# Patient Record
Sex: FEMALE | Race: WHITE | NOT HISPANIC OR LATINO | URBAN - METROPOLITAN AREA
[De-identification: names, ages, dates, MRNs, and addresses within clinical notes are randomized per-mention and may not be internally consistent; named-entity substitution may affect disease eponyms.]

---

## 2023-11-30 ENCOUNTER — EMERGENCY (EMERGENCY)
Facility: HOSPITAL | Age: 63
LOS: 1 days | Discharge: HOME | End: 2023-11-30
Admitting: EMERGENCY MEDICINE
Payer: MEDICARE

## 2023-11-30 VITALS
RESPIRATION RATE: 16 BRPM | SYSTOLIC BLOOD PRESSURE: 146 MMHG | HEART RATE: 97 BPM | TEMPERATURE: 98 F | DIASTOLIC BLOOD PRESSURE: 64 MMHG | OXYGEN SATURATION: 97 %

## 2023-11-30 DIAGNOSIS — M79.671 PAIN IN RIGHT FOOT: ICD-10-CM
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
## 2023-11-30 DIAGNOSIS — S90.811A ABRASION, RIGHT FOOT, INITIAL ENCOUNTER: ICD-10-CM

## 2023-11-30 PROCEDURE — 99283 EMERGENCY DEPT VISIT LOW MDM: CPT

## 2023-11-30 PROCEDURE — 73630 X-RAY EXAM OF FOOT: CPT | Mod: 26,RT

## 2023-11-30 NOTE — ED PROVIDER NOTE - OBJECTIVE STATEMENT
64 y/o female here with multiple complaints now here stating she thinks there may be glass in her right foot after stepping on some weeks ago. No acute complaints. Patient is a poor historian. Patient appears well NAD stable. Triage note appreciated -primary complaint is determining whether glass remains in foot

## 2023-11-30 NOTE — ED ADULT NURSE NOTE - OBJECTIVE STATEMENT
Pt presents to ed co cut on foot to right foot x 2 weeks. Patient able to ambulate with walker. Pt denies pain in foot. Pt also reports that she had a concussion oct 17. Pt would like reeval, reports recent admission, upon dc said they wanted to follow up for brain bleed.

## 2023-11-30 NOTE — ED ADULT TRIAGE NOTE - CHIEF COMPLAINT QUOTE
Pt states she stepped on a glass ornament x 2 wks ago. Pt states she thinks there's still glass in foot, ambulating with steady gait. Pt states trip/fall in october with +HS, ct scan was done but the results are unclear, pt poor historian. Pt states she was kept overnight and given a blood thinner and seizure meds. Hx MS

## 2023-11-30 NOTE — ED PROVIDER NOTE - PATIENT PORTAL LINK FT
You can access the FollowMyHealth Patient Portal offered by [facility] by registering at the following website: [email or web address]. By joining [employer]’s FollowMyHealth portal, you will also be able to view your health information using other applications (apps) compatible with our system.

## 2023-11-30 NOTE — ED PROVIDER NOTE - CLINICAL SUMMARY MEDICAL DECISION MAKING FREE TEXT BOX
patient here with request to r/o FB in right foot.   XR wet read neg  Poor historian    d/c with return precautions

## 2023-12-03 ENCOUNTER — EMERGENCY (EMERGENCY)
Facility: HOSPITAL | Age: 63
LOS: 1 days | Discharge: HOME | End: 2023-12-03
Attending: EMERGENCY MEDICINE | Admitting: EMERGENCY MEDICINE
Payer: MEDICARE

## 2023-12-03 VITALS
TEMPERATURE: 100 F | DIASTOLIC BLOOD PRESSURE: 92 MMHG | RESPIRATION RATE: 16 BRPM | HEIGHT: 64 IN | WEIGHT: 121.92 LBS | OXYGEN SATURATION: 98 % | HEART RATE: 94 BPM | SYSTOLIC BLOOD PRESSURE: 152 MMHG

## 2023-12-03 VITALS — HEART RATE: 77 BPM
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
## 2023-12-03 DIAGNOSIS — Z87.39 PERSONAL HISTORY OF OTHER DISEASES OF THE MUSCULOSKELETAL SYSTEM AND CONNECTIVE TISSUE: ICD-10-CM

## 2023-12-03 DIAGNOSIS — R51.9 HEADACHE, UNSPECIFIED: ICD-10-CM

## 2023-12-03 DIAGNOSIS — S06.5XAA TRAUMATIC SUBDURAL HEMORRHAGE WITH LOSS OF CONSCIOUSNESS STATUS UNKNOWN, INITIAL ENCOUNTER: ICD-10-CM
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
## 2023-12-03 LAB
ALBUMIN SERPL ELPH-MCNC: 4.7 G/DL — SIGNIFICANT CHANGE UP (ref 3.4–5)
ALBUMIN SERPL ELPH-MCNC: 4.7 G/DL — SIGNIFICANT CHANGE UP (ref 3.4–5)
ALP SERPL-CCNC: 66 U/L — SIGNIFICANT CHANGE UP (ref 40–120)
ALP SERPL-CCNC: 66 U/L — SIGNIFICANT CHANGE UP (ref 40–120)
ALT FLD-CCNC: 13 U/L — SIGNIFICANT CHANGE UP (ref 12–42)
ALT FLD-CCNC: 13 U/L — SIGNIFICANT CHANGE UP (ref 12–42)
ANION GAP SERPL CALC-SCNC: 13 MMOL/L — SIGNIFICANT CHANGE UP (ref 9–16)
ANION GAP SERPL CALC-SCNC: 13 MMOL/L — SIGNIFICANT CHANGE UP (ref 9–16)
APTT BLD: 36.9 SEC — HIGH (ref 24.5–35.6)
APTT BLD: 36.9 SEC — HIGH (ref 24.5–35.6)
AST SERPL-CCNC: 12 U/L — LOW (ref 15–37)
AST SERPL-CCNC: 12 U/L — LOW (ref 15–37)
BASOPHILS # BLD AUTO: 0.05 K/UL — SIGNIFICANT CHANGE UP (ref 0–0.2)
BASOPHILS # BLD AUTO: 0.05 K/UL — SIGNIFICANT CHANGE UP (ref 0–0.2)
BASOPHILS NFR BLD AUTO: 0.6 % — SIGNIFICANT CHANGE UP (ref 0–2)
BASOPHILS NFR BLD AUTO: 0.6 % — SIGNIFICANT CHANGE UP (ref 0–2)
BILIRUB SERPL-MCNC: 0.4 MG/DL — SIGNIFICANT CHANGE UP (ref 0.2–1.2)
BILIRUB SERPL-MCNC: 0.4 MG/DL — SIGNIFICANT CHANGE UP (ref 0.2–1.2)
BUN SERPL-MCNC: 19 MG/DL — SIGNIFICANT CHANGE UP (ref 7–23)
BUN SERPL-MCNC: 19 MG/DL — SIGNIFICANT CHANGE UP (ref 7–23)
CALCIUM SERPL-MCNC: 10.4 MG/DL — SIGNIFICANT CHANGE UP (ref 8.5–10.5)
CALCIUM SERPL-MCNC: 10.4 MG/DL — SIGNIFICANT CHANGE UP (ref 8.5–10.5)
CHLORIDE SERPL-SCNC: 100 MMOL/L — SIGNIFICANT CHANGE UP (ref 96–108)
CHLORIDE SERPL-SCNC: 100 MMOL/L — SIGNIFICANT CHANGE UP (ref 96–108)
CO2 SERPL-SCNC: 26 MMOL/L — SIGNIFICANT CHANGE UP (ref 22–31)
CO2 SERPL-SCNC: 26 MMOL/L — SIGNIFICANT CHANGE UP (ref 22–31)
CREAT SERPL-MCNC: 0.83 MG/DL — SIGNIFICANT CHANGE UP (ref 0.5–1.3)
CREAT SERPL-MCNC: 0.83 MG/DL — SIGNIFICANT CHANGE UP (ref 0.5–1.3)
EGFR: 79 ML/MIN/1.73M2 — SIGNIFICANT CHANGE UP
EGFR: 79 ML/MIN/1.73M2 — SIGNIFICANT CHANGE UP
EOSINOPHIL # BLD AUTO: 0.12 K/UL — SIGNIFICANT CHANGE UP (ref 0–0.5)
EOSINOPHIL # BLD AUTO: 0.12 K/UL — SIGNIFICANT CHANGE UP (ref 0–0.5)
EOSINOPHIL NFR BLD AUTO: 1.4 % — SIGNIFICANT CHANGE UP (ref 0–6)
EOSINOPHIL NFR BLD AUTO: 1.4 % — SIGNIFICANT CHANGE UP (ref 0–6)
GLUCOSE BLDC GLUCOMTR-MCNC: 117 MG/DL — HIGH (ref 70–99)
GLUCOSE BLDC GLUCOMTR-MCNC: 117 MG/DL — HIGH (ref 70–99)
GLUCOSE SERPL-MCNC: 114 MG/DL — HIGH (ref 70–99)
GLUCOSE SERPL-MCNC: 114 MG/DL — HIGH (ref 70–99)
HCT VFR BLD CALC: 39.3 % — SIGNIFICANT CHANGE UP (ref 34.5–45)
HCT VFR BLD CALC: 39.3 % — SIGNIFICANT CHANGE UP (ref 34.5–45)
HGB BLD-MCNC: 13.1 G/DL — SIGNIFICANT CHANGE UP (ref 11.5–15.5)
HGB BLD-MCNC: 13.1 G/DL — SIGNIFICANT CHANGE UP (ref 11.5–15.5)
IMM GRANULOCYTES NFR BLD AUTO: 0.2 % — SIGNIFICANT CHANGE UP (ref 0–0.9)
IMM GRANULOCYTES NFR BLD AUTO: 0.2 % — SIGNIFICANT CHANGE UP (ref 0–0.9)
INR BLD: 1.04 — SIGNIFICANT CHANGE UP (ref 0.85–1.18)
INR BLD: 1.04 — SIGNIFICANT CHANGE UP (ref 0.85–1.18)
LACTATE BLDV-MCNC: 1.5 MMOL/L — SIGNIFICANT CHANGE UP (ref 0.5–2)
LACTATE BLDV-MCNC: 1.5 MMOL/L — SIGNIFICANT CHANGE UP (ref 0.5–2)
LYMPHOCYTES # BLD AUTO: 3.86 K/UL — HIGH (ref 1–3.3)
LYMPHOCYTES # BLD AUTO: 3.86 K/UL — HIGH (ref 1–3.3)
LYMPHOCYTES # BLD AUTO: 44.5 % — HIGH (ref 13–44)
LYMPHOCYTES # BLD AUTO: 44.5 % — HIGH (ref 13–44)
MAGNESIUM SERPL-MCNC: 2.4 MG/DL — SIGNIFICANT CHANGE UP (ref 1.6–2.6)
MAGNESIUM SERPL-MCNC: 2.4 MG/DL — SIGNIFICANT CHANGE UP (ref 1.6–2.6)
MCHC RBC-ENTMCNC: 30.3 PG — SIGNIFICANT CHANGE UP (ref 27–34)
MCHC RBC-ENTMCNC: 30.3 PG — SIGNIFICANT CHANGE UP (ref 27–34)
MCHC RBC-ENTMCNC: 33.3 GM/DL — SIGNIFICANT CHANGE UP (ref 32–36)
MCHC RBC-ENTMCNC: 33.3 GM/DL — SIGNIFICANT CHANGE UP (ref 32–36)
MCV RBC AUTO: 90.8 FL — SIGNIFICANT CHANGE UP (ref 80–100)
MCV RBC AUTO: 90.8 FL — SIGNIFICANT CHANGE UP (ref 80–100)
MONOCYTES # BLD AUTO: 0.87 K/UL — SIGNIFICANT CHANGE UP (ref 0–0.9)
MONOCYTES # BLD AUTO: 0.87 K/UL — SIGNIFICANT CHANGE UP (ref 0–0.9)
MONOCYTES NFR BLD AUTO: 10 % — SIGNIFICANT CHANGE UP (ref 2–14)
MONOCYTES NFR BLD AUTO: 10 % — SIGNIFICANT CHANGE UP (ref 2–14)
NEUTROPHILS # BLD AUTO: 3.75 K/UL — SIGNIFICANT CHANGE UP (ref 1.8–7.4)
NEUTROPHILS # BLD AUTO: 3.75 K/UL — SIGNIFICANT CHANGE UP (ref 1.8–7.4)
NEUTROPHILS NFR BLD AUTO: 43.3 % — SIGNIFICANT CHANGE UP (ref 43–77)
NEUTROPHILS NFR BLD AUTO: 43.3 % — SIGNIFICANT CHANGE UP (ref 43–77)
NRBC # BLD: 0 /100 WBCS — SIGNIFICANT CHANGE UP (ref 0–0)
NRBC # BLD: 0 /100 WBCS — SIGNIFICANT CHANGE UP (ref 0–0)
NT-PROBNP SERPL-SCNC: 27 PG/ML — SIGNIFICANT CHANGE UP
NT-PROBNP SERPL-SCNC: 27 PG/ML — SIGNIFICANT CHANGE UP
PCO2 BLDV: 50 MMHG — HIGH (ref 39–42)
PCO2 BLDV: 50 MMHG — HIGH (ref 39–42)
PH BLDV: 7.38 — SIGNIFICANT CHANGE UP (ref 7.32–7.43)
PH BLDV: 7.38 — SIGNIFICANT CHANGE UP (ref 7.32–7.43)
PLATELET # BLD AUTO: 343 K/UL — SIGNIFICANT CHANGE UP (ref 150–400)
PLATELET # BLD AUTO: 343 K/UL — SIGNIFICANT CHANGE UP (ref 150–400)
PO2 BLDV: <35 MMHG — LOW (ref 25–45)
PO2 BLDV: <35 MMHG — LOW (ref 25–45)
POTASSIUM SERPL-MCNC: 4.6 MMOL/L — SIGNIFICANT CHANGE UP (ref 3.5–5.3)
POTASSIUM SERPL-MCNC: 4.6 MMOL/L — SIGNIFICANT CHANGE UP (ref 3.5–5.3)
POTASSIUM SERPL-SCNC: 4.6 MMOL/L — SIGNIFICANT CHANGE UP (ref 3.5–5.3)
POTASSIUM SERPL-SCNC: 4.6 MMOL/L — SIGNIFICANT CHANGE UP (ref 3.5–5.3)
PROT SERPL-MCNC: 8.3 G/DL — HIGH (ref 6.4–8.2)
PROT SERPL-MCNC: 8.3 G/DL — HIGH (ref 6.4–8.2)
PROTHROM AB SERPL-ACNC: 11.7 SEC — SIGNIFICANT CHANGE UP (ref 9.5–13)
PROTHROM AB SERPL-ACNC: 11.7 SEC — SIGNIFICANT CHANGE UP (ref 9.5–13)
RBC # BLD: 4.33 M/UL — SIGNIFICANT CHANGE UP (ref 3.8–5.2)
RBC # BLD: 4.33 M/UL — SIGNIFICANT CHANGE UP (ref 3.8–5.2)
RBC # FLD: 12.5 % — SIGNIFICANT CHANGE UP (ref 10.3–14.5)
RBC # FLD: 12.5 % — SIGNIFICANT CHANGE UP (ref 10.3–14.5)
SAO2 % BLDV: 19.3 % — LOW (ref 67–88)
SAO2 % BLDV: 19.3 % — LOW (ref 67–88)
SODIUM SERPL-SCNC: 139 MMOL/L — SIGNIFICANT CHANGE UP (ref 132–145)
SODIUM SERPL-SCNC: 139 MMOL/L — SIGNIFICANT CHANGE UP (ref 132–145)
TROPONIN I, HIGH SENSITIVITY RESULT: 5.7 NG/L — SIGNIFICANT CHANGE UP
TROPONIN I, HIGH SENSITIVITY RESULT: 5.7 NG/L — SIGNIFICANT CHANGE UP
WBC # BLD: 8.67 K/UL — SIGNIFICANT CHANGE UP (ref 3.8–10.5)
WBC # BLD: 8.67 K/UL — SIGNIFICANT CHANGE UP (ref 3.8–10.5)
WBC # FLD AUTO: 8.67 K/UL — SIGNIFICANT CHANGE UP (ref 3.8–10.5)
WBC # FLD AUTO: 8.67 K/UL — SIGNIFICANT CHANGE UP (ref 3.8–10.5)

## 2023-12-03 PROCEDURE — 70450 CT HEAD/BRAIN W/O DYE: CPT | Mod: 26,MH

## 2023-12-03 PROCEDURE — 99284 EMERGENCY DEPT VISIT MOD MDM: CPT | Mod: GC

## 2023-12-03 PROCEDURE — 71046 X-RAY EXAM CHEST 2 VIEWS: CPT | Mod: 26

## 2023-12-03 RX ORDER — SODIUM CHLORIDE 9 MG/ML
1000 INJECTION INTRAMUSCULAR; INTRAVENOUS; SUBCUTANEOUS ONCE
Refills: 0 | Status: COMPLETED | OUTPATIENT
Start: 2023-12-03 | End: 2023-12-03

## 2023-12-03 RX ORDER — DEXAMETHASONE 0.5 MG/5ML
1 ELIXIR ORAL
Qty: 4 | Refills: 0
Start: 2023-12-03 | End: 2023-12-06

## 2023-12-03 RX ORDER — DEXAMETHASONE 0.5 MG/5ML
1 ELIXIR ORAL
Qty: 3 | Refills: 0
Start: 2023-12-03 | End: 2023-12-05

## 2023-12-03 RX ORDER — DEXAMETHASONE 0.5 MG/5ML
1 ELIXIR ORAL
Qty: 16 | Refills: 0
Start: 2023-12-03 | End: 2023-12-06

## 2023-12-03 RX ORDER — DEXAMETHASONE 0.5 MG/5ML
4 ELIXIR ORAL ONCE
Refills: 0 | Status: COMPLETED | OUTPATIENT
Start: 2023-12-03 | End: 2023-12-03

## 2023-12-03 RX ORDER — DEXAMETHASONE 0.5 MG/5ML
1 ELIXIR ORAL
Qty: 5 | Refills: 0
Start: 2023-12-03 | End: 2023-12-07

## 2023-12-03 RX ORDER — MECLIZINE HCL 12.5 MG
25 TABLET ORAL ONCE
Refills: 0 | Status: COMPLETED | OUTPATIENT
Start: 2023-12-03 | End: 2023-12-03

## 2023-12-03 RX ORDER — DEXAMETHASONE 0.5 MG/5ML
1 ELIXIR ORAL
Qty: 12 | Refills: 0
Start: 2023-12-03 | End: 2023-12-05

## 2023-12-03 RX ORDER — DEXAMETHASONE 0.5 MG/5ML
1 ELIXIR ORAL
Qty: 2 | Refills: 0
Start: 2023-12-03 | End: 2023-12-04

## 2023-12-03 RX ADMIN — Medication 4 MILLIGRAM(S): at 21:11

## 2023-12-03 RX ADMIN — SODIUM CHLORIDE 1000 MILLILITER(S): 9 INJECTION INTRAMUSCULAR; INTRAVENOUS; SUBCUTANEOUS at 20:14

## 2023-12-03 NOTE — ED PROVIDER NOTE - CLINICAL SUMMARY MEDICAL DECISION MAKING FREE TEXT BOX
63-year-old female pmhx of MS, recent fall in October diagnosed with a hematoma (of uncertain location of the head) as well as a concussion comes to ED with reports of feeling off balance, and decreased p.o. intake. Patient was seen in [facility] and [place] for the fall/hematoma/concussion.  Patient was supposed to have a follow-up visit to repeat CT imaging 2 to 3 weeks later but did not make this visit.  Patient reports that they normally have feelings of decreased p.o. intake whenever they have MS flare. Patient reports that symptoms have been worsening over the past couple weeks worsened over the last couple days prompting visit to the emergency department. Their pain/symptom is moderate, constant, non mediating with rest. Denies fever, headache, LOC, Head Trauma, AMS, syncope, shortness of breath, cough, rhinorrhea, congestion, chest pain, palpitations, abdominal pain, nausea, vomiting, diarrhea, constipation, melena, hematochezia, dysuria, hematuria, urinary frequency, flank pain, skin changes, issues urinating, issues stooling, issues with ambulation, back pain.    Meds/blood thinner use: After discharge for hematoma patient was on Levetirecetam for 1 week as prescribed for seizure prevention. Denies blood thinner use currently.  Denies seizure med use currently.    Social hx smoking, drinking, drug use.  Allergies [facility]    Pharmacy [facility] pharmacy [id number]    PCP Dr. Hart, [id number]    General: non-toxic, NAD   HEENT: Normal tympanic membrane, no erythema, no effusion.  NCAT, PERRL   Cardiac: RRR, no murmurs, 2+ peripheral pulses   Chest: CTAB   Abdomen: soft, non-distended, bowel sounds present, no ttp, no rebound or guarding   Extremities: no peripheral edema, calf tenderness, or leg size discrepancies   Skin: no rashes   Neuro: AAOx4, 5+motor, sensory grossly intact, normal cerebellar exam.  Psych: mood and affect appropriate    Impression: 63-year-old female pmhx of MS, recent fall in October diagnosed with a hematoma (of uncertain location of the head) as well as a concussion comes to ED with reports of feeling off balance, and decreased p.o. intake. Their symptoms and exam findings are concerning for viral illness, metabolic abnormality.  Due to recent hematoma in the setting of missed CT follow-up plan to CT image to evaluate.     Ordered labs, imaging, medications for diagnosis, management, and treatment. 63-year-old female pmhx of MS, recent fall in October diagnosed with a hematoma (of uncertain location of the head) as well as a concussion comes to ED with reports of feeling off balance, and decreased p.o. intake. Patient was seen in [facility] [place] for the fall/hematoma/concussion.  Patient was supposed to have a follow-up visit to repeat CT imaging 2 to 3 weeks later but did not make this visit.  Patient reports that they normally have feelings of decreased p.o. intake whenever they have MS flare. Patient reports that symptoms have been worsening over the past couple weeks worsened over the last couple days prompting visit to the emergency department. Their pain/symptom is moderate, constant, non mediating with rest. Denies fever, headache, LOC, Head Trauma, AMS, syncope, shortness of breath, cough, rhinorrhea, congestion, chest pain, palpitations, abdominal pain, nausea, vomiting, diarrhea, constipation, melena, hematochezia, dysuria, hematuria, urinary frequency, flank pain, skin changes, issues urinating, issues stooling, issues with ambulation, back pain.    Meds/blood thinner use: After discharge for hematoma patient was on Levetirecetam for 1 week as prescribed for seizure prevention. Denies blood thinner use currently.  Denies seizure med use currently.    Social hx smoking, drinking, drug use.  Allergies [facility]    Pharmacy [facility] pharmacy [id number]    PCP Dr. Hart, [id number]    General: non-toxic, NAD   HEENT: Normal tympanic membrane, no erythema, no effusion.  NCAT, PERRL   Cardiac: RRR, no murmurs, 2+ peripheral pulses   Chest: CTAB   Abdomen: soft, non-distended, bowel sounds present, no ttp, no rebound or guarding   Extremities: no peripheral edema, calf tenderness, or leg size discrepancies   Skin: no rashes   Neuro: AAOx4, 5+motor, sensory grossly intact, normal cerebellar exam.  Psych: mood and affect appropriate    Impression: 63-year-old female pmhx of MS, recent fall in October diagnosed with a hematoma (of uncertain location of the head) as well as a concussion comes to ED with reports of feeling off balance, and decreased p.o. intake. Their symptoms and exam findings are concerning for viral illness, metabolic abnormality.  Due to recent hematoma in the setting of missed CT follow-up plan to CT image to evaluate.     Ordered labs, imaging, medications for diagnosis, management, and treatment.

## 2023-12-03 NOTE — ED PROVIDER NOTE - PATIENT PORTAL LINK FT
You can access the FollowMyHealth Patient Portal offered by [facility] by registering at the following website: [email or web address]. By joining [employer]’s FollowMyHealth portal, you will also be able to view your health information using other applications (apps) compatible with our system. You can access the FollowMyHealth Patient Portal offered by [facility] by registering at the following website: [email or web address]. By joining [facility]’s FollowMyHealth portal, you will also be able to view your health information using other applications (apps) compatible with our system.

## 2023-12-03 NOTE — ED ADULT NURSE NOTE - NSFALLUNIVINTERV_ED_ALL_ED
Bed/Stretcher in lowest position, wheels locked, appropriate side rails in place/Call bell, personal items and telephone in reach/Instruct patient to call for assistance before getting out of bed/chair/stretcher/Non-slip footwear applied when patient is off stretcher/[place] to call system/Physically safe environment - no spills, clutter or unnecessary equipment/Purposeful proactive rounding/Room/bathroom lighting operational, light cord in reach Bed/Stretcher in lowest position, wheels locked, appropriate side rails in place/Call bell, personal items and telephone in reach/Instruct patient to call for assistance before getting out of bed/chair/stretcher/Non-slip footwear applied when patient is off stretcher/[place] to call system/Physically safe environment - no spills, clutter or unnecessary equipment/Purposeful proactive rounding/Room/bathroom lighting operational, light cord in reach

## 2023-12-03 NOTE — ED PROVIDER NOTE - PROGRESS NOTE DETAILS
Spoke to Dr. POPEYE TORRES about findings on CT of small acute bleed on chronic subdural hemmorhage in addition to findings on exam of left leaning gait. Dr. POPEYE TORRES analyzed imaging and reported bleed to be chronic. Recommended patient to be sent home on a 4 mg q6  Decadron taper in addition to follow-up outpatient in 2 days.

## 2023-12-03 NOTE — ED PROVIDER NOTE - PROVIDER TOKENS
PROVIDER:[TOKEN:[[id number]],FOLLOWUP:[Urgent],ESTABLISHEDPATIENT:[T]] PROVIDER:[TOKEN:[[id number]],FOLLOWUP:[Urgent],ESTABLISHEDPATIENT:[T]]

## 2023-12-03 NOTE — ED PROVIDER NOTE - IV ALTEPLASE INCLUSION HIDDEN
show Cyclophosphamide Pregnancy And Lactation Text: This medication is Pregnancy Category D and it isn't considered safe during pregnancy. This medication is excreted in breast milk.

## 2023-12-03 NOTE — ED PROVIDER NOTE - NSFOLLOWUPINSTRUCTIONS_ED_ALL_ED_FT
You were seen in the Emergency Department for feeling off balance. A chronic subdural hematoma was seen by our neurosurgeons who reviewed imaging.   Follow-up with neurosurgeon Dr. POPEYE TORRES  in 2 days.    1) Advance activity as tolerated.   2) New prescription sent to pharmacy indicated in interview take prescription as prescribed. Continue all previously prescribed medications as directed. Take 4 mg Decadron every 6 hours for 3 days.  Take 2 mg Decadron every 6 hours for 4 days. Take 1 mg Decadron every 6 hours for 3 days.  3) Follow up with Neurosurgeon Dr. POPEYE TORRES in 2 days - take copies of your results.    4) Return to the Emergency Department for worsening or persistent symptoms, and/or ANY NEW OR CONCERNING SYMPTOMS.       SEEK IMMEDIATE MEDICAL CARE IF YOU HAVE ANY OF THE FOLLOWING SYMPTOMS: vomiting, changes in your vision or speech, weakness in your arms or legs, trouble speaking or swallowing, chest pain, abdominal pain, shortness of breath, sweating, bleeding, headache, neck pain, or fever.

## 2023-12-03 NOTE — ED ADULT TRIAGE NOTE - CHIEF COMPLAINT QUOTE
intermittent headache since october s/p ground level fall in october, dx with a concussion and a hematoma

## 2023-12-03 NOTE — ED ADULT NURSE NOTE - NS ED NURSE RECORD ANOTHER HT AND WT
Hpi Title: Evaluation of Skin Lesions How Severe Are Your Spot(S)?: mild Have Your Spot(S) Been Treated In The Past?: has not been treated Additional History: Spots of concern on face and upper chest. Yes

## 2023-12-03 NOTE — ED PROVIDER NOTE - CARE PROVIDER_API CALL
Pramod Allan  Neurosurgery  [address] [place] [address]  Phone: [phone]  Fax: [phone]  Established Patient  Follow Up Time: Urgent   Pramod Allan  Neurosurgery  [address] [facility] [place] [address]  Phone: [phone]  Fax: [phone]  Established Patient  Follow Up Time: Urgent

## 2023-12-03 NOTE — ED PROVIDER NOTE - ATTENDING CONTRIBUTION TO CARE
62yo F hx of MS, hx of recent traumatic subdural hematoma in [facility], where she was admitted to the hospital, then [place]ed with plan for repeat CT in 2-3wks, which she never got, presents with feeling off balance and headache.  States she has chronic L leg stiffness from MS which affects her gait.  On exam afebrile, VSS, well appearing, pt ambulating around the ED without assistance.  Has occasional limp due to mild stiffness of LLE on ambulation which she states is chronic.  CT head shows acute on chronic subdural.  Images reviewed by Dr. Allan from neurosurgery who believes bleed appears chronic and recommends no surgical intervention at this time.  Recommends dc w close follow up with him in office.  Discussed with pt, who would like to go home.  Offered admission to pt for observation- pt refused, wants to go home.  Understands plan to follow up with Dr. Allan in his office.  Return precautions discussed.  Stable for dc. 62yo F hx of MS, hx of recent traumatic subdural hematoma in [facility], where she was admitted to the hospital, then [place]'ed with plan for repeat CT in 2-3wks, which she never got, presents with feeling off balance and headache.  States she has chronic L leg stiffness from MS which affects her gait.  On exam afebrile, VSS, well appearing, pt ambulating around the ED without assistance.  Has occasional limp due to mild stiffness of LLE on ambulation which she states is chronic.  CT head shows acute on chronic subdural.  Images reviewed by Dr. Allan from neurosurgery who believes bleed appears chronic and recommends no surgical intervention at this time.  Recommends dc w close follow up with him in office.  Discussed with pt, who would like to go home.  Offered admission to pt for observation- pt refused, wants to go home.  Understands plan to follow up with Dr. Allan in his office.  Return precautions discussed.  Stable for dc.

## 2023-12-05 ENCOUNTER — APPOINTMENT (OUTPATIENT)
Dept: NEUROSURGERY | Facility: CLINIC | Age: 63
End: 2023-12-05
Payer: MEDICARE

## 2023-12-05 VITALS
WEIGHT: 119 LBS | HEIGHT: 62 IN | RESPIRATION RATE: 18 BRPM | BODY MASS INDEX: 21.9 KG/M2 | HEART RATE: 92 BPM | SYSTOLIC BLOOD PRESSURE: 139 MMHG | DIASTOLIC BLOOD PRESSURE: 82 MMHG | TEMPERATURE: 98.1 F | OXYGEN SATURATION: 98 %

## 2023-12-05 PROBLEM — Z00.00 ENCOUNTER FOR PREVENTIVE HEALTH EXAMINATION: Status: ACTIVE | Noted: 2023-12-05

## 2023-12-05 PROCEDURE — 99205 OFFICE O/P NEW HI 60 MIN: CPT

## 2023-12-18 ENCOUNTER — TRANSCRIPTION ENCOUNTER (OUTPATIENT)
Age: 63
End: 2023-12-18

## 2023-12-19 ENCOUNTER — APPOINTMENT (OUTPATIENT)
Dept: NEUROSURGERY | Facility: CLINIC | Age: 63
End: 2023-12-19
Payer: MEDICARE

## 2023-12-19 ENCOUNTER — APPOINTMENT (OUTPATIENT)
Dept: CT IMAGING | Facility: CLINIC | Age: 63
End: 2023-12-19
Payer: MEDICARE

## 2023-12-19 ENCOUNTER — OUTPATIENT (OUTPATIENT)
Dept: OUTPATIENT SERVICES | Facility: HOSPITAL | Age: 63
LOS: 1 days | End: 2023-12-19

## 2023-12-19 VITALS
WEIGHT: 119 LBS | SYSTOLIC BLOOD PRESSURE: 159 MMHG | RESPIRATION RATE: 18 BRPM | BODY MASS INDEX: 21.9 KG/M2 | HEIGHT: 62 IN | OXYGEN SATURATION: 98 % | HEART RATE: 105 BPM | DIASTOLIC BLOOD PRESSURE: 91 MMHG

## 2023-12-19 DIAGNOSIS — G35 MULTIPLE SCLEROSIS: ICD-10-CM

## 2023-12-19 PROCEDURE — 70450 CT HEAD/BRAIN W/O DYE: CPT | Mod: 26,MH

## 2023-12-19 PROCEDURE — 99215 OFFICE O/P EST HI 40 MIN: CPT

## 2023-12-19 RX ORDER — DIAZEPAM 5 MG/1
5 TABLET ORAL
Refills: 0 | Status: ACTIVE | COMMUNITY

## 2023-12-22 NOTE — DATA REVIEWED
[de-identified] : CT Head without contrast (12/19/23):   INDICATION: Subdural hematoma follow-up  COMPARISON: CT head 12/3/2023  FINDINGS: The CT examination demonstrates interval resolution of the right-sided subdural hematoma. There is a a mixed left frontoparietal subdural hematoma which is predominantly hypodense with linear areas of hyperdensity which may represent hemorrhage and membrane formation (series 601 images 38-44). The subdural hematoma measures approximately 12 mm in greatest width along the left frontal convexity (series 601 image 32) whereas it previously measured approximately 17 mm there remains adjacent mass effect..  The ventricles, cisternal spaces, and cortical sulci are otherwise appropriate for the patient's stated age. There is no midline shift.. The gray white differentiation appears within normal limits. There is no acute transcortical infarct.  The bony windows demonstrates no fractures. The visualized paranasal sinuses are within normal limits. The mastoid air cells are well aerated.  A Radiologist Clinical Note was sent to the referring provider by secure email.  IMPRESSION: Interval resolution of the right frontoparietal subdural hematoma. Interval decrease in the size of the left frontoparietal mixed subdural hematoma.

## 2023-12-22 NOTE — HISTORY OF PRESENT ILLNESS
[FreeTextEntry1] : b/l frontoparietal SDH [de-identified] : Patient has a PMHx significant for Multiple Sclerosis, who presents to Dr. Lanza office today for MMA embolization evaluation.    On 10/17/23, patient initially presented to [facility] [place] after sustaining a mechanical fall with LOC. She was told she had "a brain bleed" and discharged home on 7-days of Keppra. Patient was doing well thereafter and her PCP recommended to follow up with Neurology, but she could not find a specialist, so presented to the [facility] ER 12/03/23 for follow up imaging. CT Head (12/03/23) demonstrated mixed attenuation Left frontoparietal convexity 1.7cm subdural hematoma containing small amount of acute blood products. There was also a smaller mixed attenuation 0.7 cm thick Right frontoparietal convexity SDH mixed density blood. Mild patchy white matter hypoattenuation which is nonspecific in etiology but likely related to chronic microvascular ischemic disease. Patient then saw Dr. Allan who recommended patient see Dr. Lanza for MMA embolization with 2-week repeat CT Head.   TODAY 12.19.23 Patient presents to review MMA Embolization procedure Patient states she is doing "pretty normal, actually." She endorses some Left-sided pressure in her head that often radiates throughout her head.  She also endorses some chronic Right leg weakness 2/2 her MS, for which she is on Valium.  Patient denies cervicalgia, nausea/vomiting, visual disturbance, numbness/tingling, extremity weakness, or seizure-like activity.   PCP: Meron Hart [facility] [phone] [address], [place], [address]  Neurology: Dr. Ghassan Isabel [facility] [phone] [address] [place] [address]

## 2023-12-22 NOTE — REASON FOR VISIT
[New Patient Visit] : a new patient visit [Referred By: _________] : Patient was referred by ELIANE [FreeTextEntry1] : b/l Frontoparietal SDH

## 2023-12-22 NOTE — ASSESSMENT
[FreeTextEntry1] : Today I reviewed the results of the patient's most recent CT Head (12/19/23), which demonstrates interval decrease in the size of the left frontoparietal mixed subdural hematoma. I have discussed the natural history and treatment options for subdural hematoma with the patient. I explained the indications for observation and imaging surveillance, medical management, and surgery. I explained the benefits and risks of diagnostic cerebral angiogram and MMA embolization with the patient. We discussed that the risks of the procedure include but are not limited to, weakness or paralysis of the extremities, numbness, visual changes, blindness, speech difficulty, stroke, seizure, bleeding complications, and limb ischemia. In the end, the patient decided to move forward with the procedure. We will plan for cerebral angiogram and Left-sided MMA embolization next week. The patient understands the plan of care and is in agreement. All questions answered to patient satisfaction.  PLAN: - schedule MMA Embolization 1/10/23  - CT Head prior to in pre-op day of procedure. if SDH improved, cancel procedure. If unchanged, continue with embolization - medical clearance packet given to patient   The patient was instructed that if she experiences symptoms of worsening or severe thunderclap headache, syncope, unexplained nausea/vomiting, visual changes, seizure-like activity, new weakness or numbness of extremities, she should immediately call 911 or go to the emergency department.   Patient verbalizes agreement and understanding with plan of care.  I, Dr. Lanza, personally performed the evaluation and management (E/M) services for this new patient. That E/M includes conducting the initial examination, assessing all conditions, and establishing the plan of care. Today, SNOW Cortez was here to observe my evaluation and management services for this patient to be followed going forward.

## 2023-12-22 NOTE — PHYSICAL EXAM
[General Appearance - Alert] : alert [General Appearance - In No Acute Distress] : in no acute distress [Oriented To Time, Place, And Person] : oriented to person, place, and time [Affect] : the affect was normal [Mood] : the mood was normal [I: Normal Smell] : smell intact bilaterally [Motor Strength] : muscle strength was normal in all four extremities [Sensation Tactile Decrease] : light touch was intact [Sensation Pain / Temperature Decrease] : pain and temperature was intact [Intact] : all sensory within normal limits bilaterally [Sclera] : the sclera and conjunctiva were normal [PERRL With Normal Accommodation] : pupils were equal in size, round, reactive to light, with normal accommodation [Extraocular Movements] : extraocular movements were intact [Outer Ear] : the ears and nose were normal in appearance [Neck Appearance] : the appearance of the neck was normal [] : no respiratory distress [Heart Rate And Rhythm] : heart rate was normal and rhythm regular [Abdomen Soft] : soft [Skin Color & Pigmentation] : normal skin color and pigmentation [FreeTextEntry6] : Chronic Right leg weakness [FreeTextEntry8] : chronic R leg weakness and Multiple Sclerosis

## 2023-12-22 NOTE — REVIEW OF SYSTEMS
[As Noted in HPI] : as noted in HPI [Negative] : Integumentary [de-identified] : Pressure in left side of Head

## 2024-01-05 ENCOUNTER — APPOINTMENT (OUTPATIENT)
Dept: INTERNAL MEDICINE | Facility: CLINIC | Age: 64
End: 2024-01-05
Payer: MEDICARE

## 2024-01-05 ENCOUNTER — NON-APPOINTMENT (OUTPATIENT)
Age: 64
End: 2024-01-05

## 2024-01-05 VITALS
DIASTOLIC BLOOD PRESSURE: 96 MMHG | SYSTOLIC BLOOD PRESSURE: 166 MMHG | HEART RATE: 98 BPM | OXYGEN SATURATION: 99 % | RESPIRATION RATE: 17 BRPM | BODY MASS INDEX: 21.35 KG/M2 | HEIGHT: 62 IN | WEIGHT: 116 LBS | TEMPERATURE: 97.3 F

## 2024-01-05 DIAGNOSIS — Z78.9 OTHER SPECIFIED HEALTH STATUS: ICD-10-CM

## 2024-01-05 DIAGNOSIS — Z85.828 PERSONAL HISTORY OF OTHER MALIGNANT NEOPLASM OF SKIN: ICD-10-CM

## 2024-01-05 DIAGNOSIS — F41.0 PANIC DISORDER [EPISODIC PAROXYSMAL ANXIETY]: ICD-10-CM

## 2024-01-05 DIAGNOSIS — R03.0 ELEVATED BLOOD-PRESSURE READING, W/OUT DIAGNOSIS OF HYPERTENSION: ICD-10-CM

## 2024-01-05 DIAGNOSIS — Z01.818 ENCOUNTER FOR OTHER PREPROCEDURAL EXAMINATION: ICD-10-CM

## 2024-01-05 DIAGNOSIS — M62.838 OTHER MUSCLE SPASM: ICD-10-CM

## 2024-01-05 DIAGNOSIS — Z83.3 FAMILY HISTORY OF DIABETES MELLITUS: ICD-10-CM

## 2024-01-05 PROCEDURE — 36415 COLL VENOUS BLD VENIPUNCTURE: CPT

## 2024-01-05 PROCEDURE — 93000 ELECTROCARDIOGRAM COMPLETE: CPT | Mod: 59

## 2024-01-05 PROCEDURE — 99203 OFFICE O/P NEW LOW 30 MIN: CPT | Mod: 25

## 2024-01-08 LAB
ALBUMIN SERPL ELPH-MCNC: 4.9 G/DL
ALP BLD-CCNC: 73 U/L
ALT SERPL-CCNC: 12 U/L
ANION GAP SERPL CALC-SCNC: 11 MMOL/L
APTT BLD: 39.4 SEC
AST SERPL-CCNC: 13 U/L
BASOPHILS # BLD AUTO: 0.03 K/UL
BASOPHILS NFR BLD AUTO: 0.6 %
BILIRUB SERPL-MCNC: 0.4 MG/DL
BUN SERPL-MCNC: 17 MG/DL
CALCIUM SERPL-MCNC: 10.2 MG/DL
CHLORIDE SERPL-SCNC: 103 MMOL/L
CHOLEST SERPL-MCNC: 274 MG/DL
CO2 SERPL-SCNC: 27 MMOL/L
CREAT SERPL-MCNC: 0.86 MG/DL
EGFR: 76 ML/MIN/1.73M2
EOSINOPHIL # BLD AUTO: 0.08 K/UL
EOSINOPHIL NFR BLD AUTO: 1.6 %
ESTIMATED AVERAGE GLUCOSE: 123 MG/DL
GLUCOSE SERPL-MCNC: 124 MG/DL
HBA1C MFR BLD HPLC: 5.9 %
HCT VFR BLD CALC: 44.4 %
HDLC SERPL-MCNC: 75 MG/DL
HGB BLD-MCNC: 14 G/DL
IMM GRANULOCYTES NFR BLD AUTO: 0.4 %
INR PPP: 0.96 RATIO
LDLC SERPL CALC-MCNC: 187 MG/DL
LYMPHOCYTES # BLD AUTO: 1.54 K/UL
LYMPHOCYTES NFR BLD AUTO: 31.8 %
MAN DIFF?: NORMAL
MCHC RBC-ENTMCNC: 30.4 PG
MCHC RBC-ENTMCNC: 31.5 GM/DL
MCV RBC AUTO: 96.3 FL
MONOCYTES # BLD AUTO: 0.48 K/UL
MONOCYTES NFR BLD AUTO: 9.9 %
NEUTROPHILS # BLD AUTO: 2.7 K/UL
NEUTROPHILS NFR BLD AUTO: 55.7 %
NONHDLC SERPL-MCNC: 200 MG/DL
PLATELET # BLD AUTO: 370 K/UL
POTASSIUM SERPL-SCNC: 4.8 MMOL/L
PROT SERPL-MCNC: 7.2 G/DL
PT BLD: 10.9 SEC
RBC # BLD: 4.61 M/UL
RBC # FLD: 13.4 %
SODIUM SERPL-SCNC: 141 MMOL/L
TRIGL SERPL-MCNC: 81 MG/DL
WBC # FLD AUTO: 4.85 K/UL

## 2024-01-08 NOTE — ASSESSMENT
[High Risk Surgery - Intraperitoneal, Intrathoracic or Supringuinal Vascular Procedures] : High Risk Surgery - Intraperitoneal, Intrathoracic or Supringuinal Vascular Procedures - No (0) [Ischemic Heart Disease] : Ischemic Heart Disease - No (0) [Congestive Heart Failure] : Congestive Heart Failure - No (0) [Prior Cerebrovascular Accident or TIA] : Prior Cerebrovascular Accident or TIA - No (0) [Creatinine >= 2mg/dL (1 Point)] : Creatinine >= 2mg/dL - No (0) [Insulin-dependent Diabetic (1 Point)] : Insulin-dependent Diabetic - No (0) [0] : 0 , RCRI Class: I, Risk of Post-Op Cardiac Complications: 3.9%, 95% CI for Risk Estimate: 2.8% - 5.4% [Patient Optimized for Surgery] : Patient optimized for surgery [FreeTextEntry4] : Ms. OSWALDO YA is a 63-year-old female with history of MS, and SDH presenting today to the [facility] prior to cerebral angiogram with embolization. ECG today without ischemic changes and able to achieve >4 METs. Recent labs and CXR reviewed. Additional labs drawn today as per surgeon. She is considered low risk for a low-risk procedure.

## 2024-01-08 NOTE — HISTORY OF PRESENT ILLNESS
[No Pertinent Cardiac History] : no history of aortic stenosis, atrial fibrillation, coronary artery disease, recent myocardial infarction, or implantable device/pacemaker [No Pertinent Pulmonary History] : no history of asthma, COPD, sleep apnea, or smoking [(Patient denies any chest pain, claudication, dyspnea on exertion, orthopnea, palpitations or syncope)] : Patient denies any chest pain, claudication, dyspnea on exertion, orthopnea, palpitations or syncope [Moderate (4-6 METs)] : Moderate (4-6 METs) [Self] : no previous adverse anesthesia reaction [Chronic Anticoagulation] : no chronic anticoagulation [Chronic Kidney Disease] : no chronic kidney disease [Diabetes] : no diabetes [FreeTextEntry1] : CEREBRAL ANGIOGRAM WITH MMA EMBOLIZATION [FreeTextEntry2] : 1/10/23 [FreeTextEntry3] : Dr. Lanza [FreeTextEntry4] : Ms. OSWALDO YA is a 63-year-old female with history of MS, and SDH presenting today to the [facility] prior to cerebral angiogram with embolization. In October she had a mechanical fall c/b concussion and SDH. She endorses good overall health, but was told her cholesterol and blood sugar were slightly high at her recent physical. Occasionally takes valium for panic attacks or muscle spasms.  [FreeTextEntry8] : able to walk up flights of stairs without issue

## 2024-01-08 NOTE — ADDENDUM
[FreeTextEntry1] : Labs reviewed. No contraindications to procedure. Follow up with PMD for management of HLD and prediabetes.

## 2024-01-09 RX ORDER — CHLORHEXIDINE GLUCONATE 213 G/1000ML
1 SOLUTION TOPICAL ONCE
Refills: 0 | Status: DISCONTINUED | OUTPATIENT
Start: 2024-01-10 | End: 2024-01-10

## 2024-01-09 RX ORDER — CHLORHEXIDINE GLUCONATE 213 G/1000ML
1 SOLUTION TOPICAL EVERY 12 HOURS
Refills: 0 | Status: DISCONTINUED | OUTPATIENT
Start: 2024-01-10 | End: 2024-01-10

## 2024-01-10 ENCOUNTER — NON-APPOINTMENT (OUTPATIENT)
Age: 64
End: 2024-01-10

## 2024-01-10 ENCOUNTER — APPOINTMENT (OUTPATIENT)
Dept: NEUROSURGERY | Facility: HOSPITAL | Age: 64
End: 2024-01-10

## 2024-01-10 ENCOUNTER — OUTPATIENT (OUTPATIENT)
Dept: INPATIENT UNIT | Facility: HOSPITAL | Age: 64
LOS: 1 days | End: 2024-01-10
Payer: MEDICARE

## 2024-01-10 PROCEDURE — 70450 CT HEAD/BRAIN W/O DYE: CPT | Mod: 26

## 2024-01-10 PROCEDURE — 70450 CT HEAD/BRAIN W/O DYE: CPT

## 2024-01-24 ENCOUNTER — APPOINTMENT (OUTPATIENT)
Dept: CT IMAGING | Facility: CLINIC | Age: 64
End: 2024-01-24
Payer: MEDICARE

## 2024-01-24 ENCOUNTER — OUTPATIENT (OUTPATIENT)
Dept: OUTPATIENT SERVICES | Facility: HOSPITAL | Age: 64
LOS: 1 days | End: 2024-01-24

## 2024-01-24 PROCEDURE — 70450 CT HEAD/BRAIN W/O DYE: CPT | Mod: 26,MH

## 2024-01-29 ENCOUNTER — NON-APPOINTMENT (OUTPATIENT)
Age: 64
End: 2024-01-29

## 2024-01-30 ENCOUNTER — APPOINTMENT (OUTPATIENT)
Dept: NEUROSURGERY | Facility: CLINIC | Age: 64
End: 2024-01-30
Payer: MEDICARE

## 2024-01-30 PROCEDURE — 99204 OFFICE O/P NEW MOD 45 MIN: CPT

## 2024-01-30 PROCEDURE — 99214 OFFICE O/P EST MOD 30 MIN: CPT

## 2024-01-30 NOTE — REASON FOR VISIT
[Home] : at home, [unfilled] , at the time of the visit. [Medical Office: ([facility])___] : at the medical office located in  [Patient] : the patient [Self] : self [FreeTextEntry1] : Left frontal tSDH

## 2024-01-30 NOTE — HISTORY OF PRESENT ILLNESS
[FreeTextEntry1] : OSWALDO YA is a 63 year-old female with a PMHx significant for Multiple Sclerosis, who presents to Dr. Lanza office today for follow up of Subdural Hematoma.  Patient initially presented to [facility] in [place] 10/17/23 after sustaining a mechanical fall with LOC. She was told she had "a brain bleed" and discharged home on 7-days of Keppra. Patient was doing well thereafter and her PCP recommended to follow-up with Neurology, but she could not find a specialist, so presented to the [facility] ER 12/03/23 for follow-up imaging. CT Head (12/03/23) demonstrated mixed attenuation Left frontoparietal convexity 1.7cm subdural hematoma containing small amount of acute blood products. There was also a smaller mixed attenuation 0.7 cm thick Right frontoparietal convexity SDH mixed density blood. Mild patchy white matter hypoattenuation which is nonspecific in etiology but likely related to chronic microvascular ischemic disease. Patient then saw Dr. Allan who recommended patient see Dr. Lanza for MMA embolization with 2-week repeat CT Head.  12/19/23 - She endorses some Left-sided pressure in her head that often radiates throughout her head, as well as some chronic Right leg weakness 2/2 her MS, for which she is on Valium. Repeat CT Head and MMA Embolization scheduled for 1/10/23  1/09/24 - procedure canceled as patient anxious and refused   Today 1/30/24 - patient returns to clinic to review repeat CT Head done on 1/24/24. She states she is doing much better, has regained her strength back to baseline, and denies any new weakness.   PCP: Meron [facility] [phone] [address], [place], [address]  Neurology: Dr. Ghassan Isabel [facility] [phone] [address] [place] [address]

## 2024-01-30 NOTE — DATA REVIEWED
[de-identified] : CT BRAIN  - ORDERED BY: BOOKER HUGHES   PROCEDURE DATE:  01/24/2024    INTERPRETATION:  Follow-up.  Technique: CT head was performed utilizing axial images from the base of the skull through the vertex without the administration of intravenous contrast. Images were reviewed in the bone, brain and subdural windows.  Findings: Comparison is made to a prior CT of the brain performed on 1/10/2024.  There is no evidence of acute intracranial hemorrhage or acute transcortical infarct. There has been interval mild decrease in size of mixed left frontal subdural collection now measuring approximately 5 mm in greatest diameter (image #43 series 601). The ventricles are normal in size and caliber. There is no evidence of mass-effect or midline shift. There is no evidence of an intra or extra-axial fluid collection.  Visualized paranasal sinuses and bilateral mastoid air cells are clear.  Impression: Interval decrease in high left frontal mixed subdural hemorrhage. No new hemorrhage is seen.

## 2024-01-30 NOTE — REVIEW OF SYSTEMS
[As Noted in HPI] : as noted in HPI [Anxiety] : anxiety [Depression] : depression [Negative] : Respiratory [FreeTextEntry9] : chronic weakness 2/2 MS; She feels back at baseline now though

## 2024-01-30 NOTE — PHYSICAL EXAM
[General Appearance - Alert] : alert [General Appearance - In No Acute Distress] : in no acute distress [Oriented To Time, Place, And Person] : oriented to person, place, and time [Sclera] : the sclera and conjunctiva were normal [Extraocular Movements] : extraocular movements were intact [Outer Ear] : the ears and nose were normal in appearance [Neck Appearance] : the appearance of the neck was normal [] : no respiratory distress [Skin Color & Pigmentation] : normal skin color and pigmentation

## 2024-01-30 NOTE — ASSESSMENT
[FreeTextEntry1] : TODAY 01/30/2024 OSWALDO YA is a 63 year-old female who presents for traumatic Left SDH  I reviewed and explained the results of the most recent imaging CT Head (1/24/24) with the patient, which demonstrates interval decrease in Left frontal SDH.  She is "walking better than ever", strength is back to baseline Patient denies headache, cervicalgia, nausea/vomiting, visual disturbance, numbness/tingling, new extremity weakness, or seizure-like activity. Given her improved symptoms and improved imaging, I recommend follow up with us in 3 months to review symptoms and repeat CT Head   PLAN: - repeat CT Head in 3 months (April 2024) - return to Dr. Lanza clinic to review   The patient was instructed that if they should immediately call 911 or got to the Emergency Department if they experience symptoms of severe thunderclap headache, syncope, unexplained nausea/vomiting, visual changes, seizure-like activity, new weakness or numbness of extremities.   Patient verbalizes agreement and understanding with plan of care.  I, Dr. Lanza, personally performed the evaluation and management (E/M) services for this established patient who presents today with (a) new problem(s)/exacerbation of (an) existing condition(s). That E/M includes conducting the examination, assessing all new/exacerbated conditions, and establishing a new plan of care. Today, SNOW Cortez, was here to observe my evaluation and management services for this new problem/exacerbated condition to be followed going forward.

## 2024-04-12 ENCOUNTER — OUTPATIENT (OUTPATIENT)
Dept: OUTPATIENT SERVICES | Facility: HOSPITAL | Age: 64
LOS: 1 days | End: 2024-04-12

## 2024-04-12 ENCOUNTER — APPOINTMENT (OUTPATIENT)
Dept: CT IMAGING | Facility: CLINIC | Age: 64
End: 2024-04-12
Payer: MEDICARE

## 2024-04-12 PROCEDURE — 70450 CT HEAD/BRAIN W/O DYE: CPT | Mod: 26,MH

## 2024-04-22 PROBLEM — F07.81 CONCUSSION SYNDROME: Status: ACTIVE | Noted: 2024-01-05

## 2024-04-22 PROBLEM — S06.5XAA SUBDURAL HEMATOMA: Status: ACTIVE | Noted: 2023-12-05

## 2024-04-23 ENCOUNTER — APPOINTMENT (OUTPATIENT)
Dept: NEUROSURGERY | Facility: CLINIC | Age: 64
End: 2024-04-23
Payer: MEDICARE

## 2024-04-23 VITALS
DIASTOLIC BLOOD PRESSURE: 88 MMHG | SYSTOLIC BLOOD PRESSURE: 151 MMHG | WEIGHT: 121 LBS | OXYGEN SATURATION: 98 % | BODY MASS INDEX: 20.66 KG/M2 | HEIGHT: 64 IN | HEART RATE: 82 BPM | RESPIRATION RATE: 18 BRPM | TEMPERATURE: 97.8 F

## 2024-04-23 DIAGNOSIS — F07.81 POSTCONCUSSIONAL SYNDROME: ICD-10-CM

## 2024-04-23 DIAGNOSIS — S06.5XAA TRAUMATIC SUBDURAL HEMORRHAGE WITH LOSS OF CONSCIOUSNESS STATUS UNKNOWN, INITIAL ENCOUNTER: ICD-10-CM

## 2024-04-23 PROCEDURE — 99215 OFFICE O/P EST HI 40 MIN: CPT

## 2024-04-23 NOTE — PHYSICAL EXAM
[General Appearance - Alert] : alert [Oriented To Time, Place, And Person] : oriented to person, place, and time [Impaired Insight] : insight and judgment were intact [Affect] : the affect was normal [Mood] : the mood was normal [Memory Recent] : recent memory was not impaired [Memory Remote] : remote memory was not impaired [Motor Tone] : muscle tone was normal in all four extremities [Motor Strength] : muscle strength was normal in all four extremities [Balance] : balance was intact [Sclera] : the sclera and conjunctiva were normal [Extraocular Movements] : extraocular movements were intact [Full Visual Field] : full visual field [Outer Ear] : the ears and nose were normal in appearance [Hearing Threshold Finger Rub Not [place]] : hearing was normal [Neck Appearance] : the appearance of the neck was normal [] : no respiratory distress [Heart Rate And Rhythm] : heart rate was normal and rhythm regular [Abnormal Walk] : normal gait [Skin Color & Pigmentation] : normal skin color and pigmentation

## 2024-04-25 NOTE — DATA REVIEWED
[de-identified] : CT BRAIN  - ORDERED BY: BOOKER HUGHES (4/12/24) COMPARISON: prior CT of 1/10/2020 4/1/2024 140 20 4/20/2024.  Since the prior examination there is almost complete resolution of the small left frontal subdural collection now measuring 3.9 mm in depth compared with the prior 5.1 mm. No new hemorrhage is identified. Ventricles and sulci normal for the patient's age. There is no midline shift.  IMPRESSION: Decreasing left frontal subdural collection compared with 1/24/2024 with a small residual.

## 2024-04-25 NOTE — HISTORY OF PRESENT ILLNESS
[FreeTextEntry1] : OSWALDO YA is a 63 year-old female with a PMHx significant for Multiple Sclerosis, who presents to Dr. Lanza office today for follow up of Subdural Hematoma.  Patient initially presented to [facility] in [place] 10/17/23 after sustaining a mechanical fall with LOC. She was told she had "a brain bleed" and discharged home on 7-days of Keppra. Patient was doing well thereafter and her PCP recommended to follow-up with Neurology, but she could not find a specialist, so presented to the [facility] ER 12/03/23 for follow-up imaging. CT Head (12/03/23) demonstrated mixed attenuation Left frontoparietal convexity 1.7cm subdural hematoma containing small amount of acute blood products. There was also a smaller mixed attenuation 0.7 cm thick Right frontoparietal convexity SDH mixed density blood. Mild patchy white matter hypoattenuation which is nonspecific in etiology but likely related to chronic microvascular ischemic disease. Patient then saw Dr. Allan who recommended patient see Dr. Lanza for MMA embolization with 2-week repeat CT Head.  12/19/23 - first appt with Dr. Lanza. She endorses some Left-sided pressure in her head that often radiates throughout her head, as well as some chronic Right leg weakness 2/2 her MS, for which she is on Valium. Repeat CT Head and MMA Embolization scheduled for 1/10/23  1/09/24 - procedure canceled as patient anxious and refused  1/30/24 - appt to review repeat CT Head done on 1/24/24. She states she is doing much better, has regained her strength back to baseline, and denies any new weakness. Plan to repeat CT Head in 3 months (April 2024) and return to clinic to review  PCP: Meron Hart [facility] [phone] [address], [place], [address]  Neurology: Dr. Ghassan Isabel [facility] [phone] [address] [place] [address]

## 2024-05-20 ENCOUNTER — EMERGENCY (EMERGENCY)
Facility: HOSPITAL | Age: 64
LOS: 1 days | Discharge: HOME | End: 2024-05-20
Admitting: EMERGENCY MEDICINE
Payer: MEDICARE

## 2024-05-20 VITALS
WEIGHT: 119.93 LBS | SYSTOLIC BLOOD PRESSURE: 187 MMHG | HEART RATE: 81 BPM | HEIGHT: 64 IN | OXYGEN SATURATION: 100 % | RESPIRATION RATE: 15 BRPM | DIASTOLIC BLOOD PRESSURE: 98 MMHG | TEMPERATURE: 98 F

## 2024-05-20 VITALS
SYSTOLIC BLOOD PRESSURE: 175 MMHG | RESPIRATION RATE: 18 BRPM | HEART RATE: 78 BPM | DIASTOLIC BLOOD PRESSURE: 90 MMHG | OXYGEN SATURATION: 99 %

## 2024-05-20 PROCEDURE — 99284 EMERGENCY DEPT VISIT MOD MDM: CPT

## 2024-05-20 PROCEDURE — 70450 CT HEAD/BRAIN W/O DYE: CPT | Mod: 26,MC

## 2024-05-20 NOTE — ED PROVIDER NOTE - NSFOLLOWUPINSTRUCTIONS_ED_ALL_ED_FT
You were seen in the ED for headache after hitting your head. Your CT did not show any new bleeds or fracture.    Take Tylenol 650mg (Two regular strength 325 mg pills) every 4-6 hours or two extra strength 500mg tablets every 8 hours as needed for pain or fevers. Do not exceed 1000mg at one time or 4000mg in a 24 hour period.    Please follow-up with your primary care doctor or your neurosurgeon if pain does not get any better - you may need an MRI to further evaluate this pain if your providers recommend this.     Return to the ED for any new or worsening symptoms including severe headache that does not get better with medication, changes in your vision or speech, numbness/tingling/weakness in your arms or legs, nausea or vomiting.       Head Injury    WHAT YOU NEED TO KNOW:    A head injury can include your scalp, face, skull, or brain and range from mild to severe. Effects can appear immediately after the injury or develop later. The effects may last a short time or be permanent. Healthcare providers may want to check your recovery over time. Treatment may change as you recover or develop new health problems from the head injury.    DISCHARGE INSTRUCTIONS:    Call your local emergency number (911 in the ), or have someone else call if:   •You cannot be woken.      •You have a seizure.      •You stop responding to others or you faint.      •You have blurry or double vision.      •Your speech becomes slurred or confused.      •You have arm or leg weakness, loss of feeling, or new problems with coordination.      •Your pupils are larger than usual, or one pupil is a different size than the other.      •You have blood or clear fluid coming out of your ears or nose.      Seek care immediately if:   •You have repeated or forceful vomiting.      •You feel confused.      •Your headache gets worse or becomes severe.      •You or someone caring for you notices that you are harder to wake than usual.      Call your doctor if:   •Your symptoms last longer than 6 weeks after the injury.      •You have questions or concerns about your condition or care.      Medicines:   •Acetaminophen decreases pain and fever. It is available without a doctor's order. Ask how much to take and how often to take it. Follow directions. Read the labels of all other medicines you are using to see if they also contain acetaminophen, or ask your doctor or pharmacist. Acetaminophen can cause liver damage if not taken correctly. Do not use more than 4 grams (4,000 milligrams) total of acetaminophen in one day.       •Take your medicine as directed. Contact your healthcare provider if you think your medicine is not helping or if you have side effects. Tell him or her if you are allergic to any medicine. Keep a list of the medicines, vitamins, and herbs you take. Include the amounts, and when and why you take them. Bring the list or the pill bottles to follow-up visits. Carry your medicine list with you in case of an emergency.      Self-care:   •Rest or do quiet activities. Limit your time watching TV, using the computer, or doing tasks that require a lot of thinking. Slowly return to your normal activities as directed. Do not play sports or do activities that may cause you to get hit in the head. Ask your healthcare provider when you can return to sports.      •Apply ice on your head for 15 to 20 minutes every hour or as directed. Use an ice pack, or put crushed ice in a plastic bag. Cover it with a towel before you apply it to your skin. Ice helps prevent tissue damage and decreases swelling and pain.      •Have someone stay with you for 24 hours , or as directed. This person can monitor you for problems and call for help if needed. When you are awake, the person should ask you a few questions every few hours to see if you are thinking clearly. An example is to ask your name or address.      Prevent another head injury:   •Wear a helmet that fits properly. Do this when you play sports, or ride a bike, scooter, or skateboard. Helmets help decrease your risk for a serious head injury. Talk to your healthcare provider about other ways you can protect yourself if you play sports.      •Wear your seatbelt every time you are in a car. This helps lower your risk for a head injury if you are in a car accident.      Follow up with your doctor as directed: Write down your questions so you remember to ask them during your visits.

## 2024-05-20 NOTE — ED PROVIDER NOTE - CLINICAL SUMMARY MEDICAL DECISION MAKING FREE TEXT BOX
63-year-old female with past medical history of MS and subdural hematoma in October 2023 presents to the ED complaining of head pain after hitting her head against a table last week on May 13.  Physical exam findings are reassuring.  No palpable scalp hematoma.  Patient with tenderness to palpation over occiput. No focal neuro deficits. Patient currently afebrile, hemodynamically stable, spO2 100%. Based on history and physical, differentials include but are not limited to ICH vs contusion vs concussion. Plan to assess patient for acute pathology as listed above with CT. Pt declining pain medication at this time. Will f/u results and reassess.

## 2024-05-20 NOTE — ED PROVIDER NOTE - PROGRESS NOTE DETAILS
CT with no acute findings.  Shared Decision Making - Reassessment performed. Discussed results with patient. Advised pt to f/u with her PCP and neurosurgeon if pain does not improve.   Patient is medically stable for discharge. Strict return precautions given. Patient to follow up with PMD. Patient displays understanding and agreeable with plan, comfortable with discharge plan home.

## 2024-05-20 NOTE — ED PROVIDER NOTE - PATIENT PORTAL LINK FT
You can access the FollowMyHealth Patient Portal offered by [facility] by registering at the following website: [email or web address]. By joining [employer]’s FollowMyHealth portal, you will also be able to view your health information using other applications (apps) compatible with our system.

## 2024-05-20 NOTE — ED ADULT TRIAGE NOTE - CHIEF COMPLAINT QUOTE
Pt hit her head on a table last Monday 5/13. No LOC, no AC use. AOx4, GCS 15. Pt has had continued posterior headaches since. Pt reports hx of either a subdural hematoma or hemorrhage previously, she is unsure.

## 2024-05-20 NOTE — ED PROVIDER NOTE - PHYSICAL EXAMINATION
General: Well appearing in no acute distress, alert and cooperative  Head: Normocephalic. +tenderness to palpation over top and back of head with no palpable hematomas or step-offs.   Eyes: PERRLA, no conjunctival injection, no scleral icterus, EOMI  Neck: Soft and supple, full ROM without pain, no midline tenderness  Cardiac: Regular rate and regular rhythm, no murmurs  Resp: Unlabored respiratory effort, lungs CTAB, speaking in full sentences, no wheezes  MSK: Spine midline and non-tender  Skin: Warm and dry, no rashes/abrasions/lacerations  Neuro: AO x 3, moves all extremities symmetrically, Motor strength 5/5 bilaterally UE and LE, sensation grossly intact. Normal gait. Speech is fluent and appropriate. CN 3-12 intact. No facial droop or pronator drift.

## 2024-05-20 NOTE — ED PROVIDER NOTE - OBJECTIVE STATEMENT
63-year-old female with history of multiple sclerosis presents to the ED complaining of head injury last week and continued pain around injury site.  Patient states that she bent down to pick something up and when she stood up she hit the back of her head against a table.  Patient reports increased pain when placing pressure or lying down on top of side of injury.  Denies LOC, changes in vision, neck pain, anticoagulation use, unsteady gait, nausea vomiting, dizziness or lightheadedness, fever or chills.  Denies any pain anywhere else.  Patient does endorse a history of SDH in October 2023 left frontal and temporal side after falling and hitting her head against bricks.  Patient has been following with Dr. Lanza, neurosurgery and last CT was a month ago which showed that SDH has decreased in size.  No known drug allergies.

## 2024-05-24 DIAGNOSIS — Z86.69 PERSONAL HISTORY OF OTHER DISEASES OF THE NERVOUS SYSTEM AND SENSE ORGANS: ICD-10-CM
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
## 2024-05-24 DIAGNOSIS — G35 MULTIPLE SCLEROSIS: ICD-10-CM

## 2024-05-24 DIAGNOSIS — R51.9 HEADACHE, UNSPECIFIED: ICD-10-CM
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
## 2024-05-24 DIAGNOSIS — S00.83XA CONTUSION OF OTHER PART OF HEAD, INITIAL ENCOUNTER: ICD-10-CM

## 2025-04-02 NOTE — ED ADULT TRIAGE NOTE - GLASGOW COMA SCALE: EYE OPENING, MLM
Assessment/Plan:  Rosamaria Agosto is a 63 y.o. female with HTN, HLD, venous insufficiency, overweight, who presents for an initial appointment.    SOB on Exertion- Pt with risk factors for CAD. Check exercise stress echo with doppler.     2. HTN- Continue HCTZ 25 mg daily.    3. HLD- Continue rosuvastatin 20 mg daily.     4. Overweight- Encourage diet, exercise, and weight loss.    5. Venous Insufficiency- Stable. Recommend wearing graduated compression hose.  Limit sodium intake to 2000 mg daily.  Elevate legs when resting.    Will call pt with results of stress test  Otherwise, follow up in 3 months with lipids prior   (E4) spontaneous